# Patient Record
Sex: FEMALE | URBAN - NONMETROPOLITAN AREA
[De-identification: names, ages, dates, MRNs, and addresses within clinical notes are randomized per-mention and may not be internally consistent; named-entity substitution may affect disease eponyms.]

---

## 2019-05-16 ENCOUNTER — OFFICE VISIT (OUTPATIENT)
Dept: URBAN - NONMETROPOLITAN AREA CLINIC 9 | Facility: CLINIC | Age: 59
End: 2019-05-16
Payer: COMMERCIAL

## 2019-05-16 DIAGNOSIS — Z13.5 ENCOUNTER FOR SCREENING FOR EYE DISORDERS: Primary | ICD-10-CM

## 2019-05-16 DIAGNOSIS — H04.123 DRY EYE SYNDROME OF BILATERAL LACRIMAL GLANDS: Chronic | ICD-10-CM

## 2019-05-16 DIAGNOSIS — H25.13 AGE-RELATED NUCLEAR CATARACT, BILATERAL: ICD-10-CM

## 2019-05-16 PROCEDURE — 92004 COMPRE OPH EXAM NEW PT 1/>: CPT | Performed by: OPTOMETRIST

## 2019-05-16 ASSESSMENT — INTRAOCULAR PRESSURE
OS: 15
OD: 16

## 2019-05-16 ASSESSMENT — VISUAL ACUITY
OS: 20/25
OD: 20/20

## 2019-05-16 NOTE — IMPRESSION/PLAN
Impression: Encounter for screening for eye disorders: Z13.5. Plan: Discussed diagnosis in detail with patient. Normotensive IOP OU. Negative optic nerve edema OU. Negative signs of retinal occlusions OU. BCVA OD 20/20 OS 20/25. Rec. patient to return if vision worsen.

## 2019-05-16 NOTE — IMPRESSION/PLAN
Impression: Age-related nuclear cataract, bilateral: H25.13. Plan: Discussed diagnosis with patient in detail. No treatment is required at this time. Will continue to observe condition and/or symptoms. Patient instructed to call if condition gets worse. Rec. updating glasses to improve vision.